# Patient Record
Sex: FEMALE | Race: OTHER | URBAN - METROPOLITAN AREA
[De-identification: names, ages, dates, MRNs, and addresses within clinical notes are randomized per-mention and may not be internally consistent; named-entity substitution may affect disease eponyms.]

---

## 2022-07-11 ENCOUNTER — NEW PATIENT (OUTPATIENT)
Dept: URBAN - METROPOLITAN AREA CLINIC 49 | Facility: CLINIC | Age: 67
End: 2022-07-11

## 2022-07-11 DIAGNOSIS — H02.834: ICD-10-CM

## 2022-07-11 DIAGNOSIS — H25.13: ICD-10-CM

## 2022-07-11 DIAGNOSIS — H02.831: ICD-10-CM

## 2022-07-11 PROCEDURE — 92004 COMPRE OPH EXAM NEW PT 1/>: CPT

## 2022-07-11 PROCEDURE — 92015 DETERMINE REFRACTIVE STATE: CPT

## 2022-07-11 ASSESSMENT — VISUAL ACUITY
OD_CC: 20/20-2
OD_GLARE: 20/30
OD_GLARE: 20/50
OU_SC: J3@18"
OS_GLARE: 20/70
OS_GLARE: 20/200
OS_CC: 20/25-2

## 2022-07-11 ASSESSMENT — TONOMETRY
OD_IOP_MMHG: 11
OS_IOP_MMHG: 11

## 2023-01-05 ENCOUNTER — APPOINTMENT (RX ONLY)
Dept: URBAN - METROPOLITAN AREA CLINIC 56 | Facility: CLINIC | Age: 68
Setting detail: DERMATOLOGY
End: 2023-01-05

## 2023-01-05 DIAGNOSIS — L57.0 ACTINIC KERATOSIS: ICD-10-CM

## 2023-01-05 DIAGNOSIS — L82.1 OTHER SEBORRHEIC KERATOSIS: ICD-10-CM

## 2023-01-05 PROCEDURE — 17003 DESTRUCT PREMALG LES 2-14: CPT

## 2023-01-05 PROCEDURE — ? LIQUID NITROGEN

## 2023-01-05 PROCEDURE — ? COUNSELING

## 2023-01-05 PROCEDURE — 17000 DESTRUCT PREMALG LESION: CPT

## 2023-01-05 PROCEDURE — 99202 OFFICE O/P NEW SF 15 MIN: CPT | Mod: 25

## 2023-01-05 ASSESSMENT — LOCATION DETAILED DESCRIPTION DERM
LOCATION DETAILED: LEFT INFERIOR LATERAL MALAR CHEEK
LOCATION DETAILED: NASAL ROOT
LOCATION DETAILED: NASAL DORSUM

## 2023-01-05 ASSESSMENT — LOCATION SIMPLE DESCRIPTION DERM
LOCATION SIMPLE: LEFT CHEEK
LOCATION SIMPLE: NOSE

## 2023-01-05 ASSESSMENT — LOCATION ZONE DERM
LOCATION ZONE: NOSE
LOCATION ZONE: FACE

## 2023-01-05 NOTE — PROCEDURE: LIQUID NITROGEN
Render Note In Bullet Format When Appropriate: No
Post-Care Instructions: I reviewed with the patient in detail post-care instructions. Patient is to wear sunprotection, and avoid picking at any of the treated lesions. Pt may apply Vaseline to crusted or scabbing areas.
Number Of Freeze-Thaw Cycles: 1 freeze-thaw cycle
Duration Of Freeze Thaw-Cycle (Seconds): 5
Show Applicator Variable?: Yes
Consent: The patient's consent was obtained including but not limited to risks of crusting, scabbing, blistering, scarring, darker or lighter pigmentary change, recurrence, incomplete removal and infection.
Detail Level: Detailed

## 2023-08-02 ENCOUNTER — COMPREHENSIVE EXAM (OUTPATIENT)
Dept: URBAN - METROPOLITAN AREA CLINIC 49 | Facility: LOCATION | Age: 68
End: 2023-08-02

## 2023-08-02 DIAGNOSIS — H52.4: ICD-10-CM

## 2023-08-02 DIAGNOSIS — H02.831: ICD-10-CM

## 2023-08-02 DIAGNOSIS — H25.13: ICD-10-CM

## 2023-08-02 DIAGNOSIS — H02.834: ICD-10-CM

## 2023-08-02 PROCEDURE — 92015 DETERMINE REFRACTIVE STATE: CPT

## 2023-08-02 PROCEDURE — 92014 COMPRE OPH EXAM EST PT 1/>: CPT

## 2023-08-02 ASSESSMENT — VISUAL ACUITY
OD_GLARE: 20/100
OD_GLARE: 20/40-1
OS_CC: 20/20
OS_GLARE: 20/70
OD_CC: 20/20
OU_CC: J2 @ 17IN
OS_GLARE: >20/400

## 2023-08-02 ASSESSMENT — TONOMETRY
OD_IOP_MMHG: 10
OS_IOP_MMHG: 10

## 2023-08-22 ENCOUNTER — APPOINTMENT (RX ONLY)
Dept: URBAN - METROPOLITAN AREA CLINIC 56 | Facility: CLINIC | Age: 68
Setting detail: DERMATOLOGY
End: 2023-08-22

## 2023-08-22 DIAGNOSIS — L82.1 OTHER SEBORRHEIC KERATOSIS: ICD-10-CM | Status: STABLE

## 2023-08-22 DIAGNOSIS — L81.4 OTHER MELANIN HYPERPIGMENTATION: ICD-10-CM | Status: STABLE

## 2023-08-22 DIAGNOSIS — D18.0 HEMANGIOMA: ICD-10-CM | Status: STABLE

## 2023-08-22 DIAGNOSIS — L57.0 ACTINIC KERATOSIS: ICD-10-CM | Status: INADEQUATELY CONTROLLED

## 2023-08-22 DIAGNOSIS — L81.5 LEUKODERMA, NOT ELSEWHERE CLASSIFIED: ICD-10-CM | Status: STABLE

## 2023-08-22 DIAGNOSIS — I83.9 ASYMPTOMATIC VARICOSE VEINS OF LOWER EXTREMITIES: ICD-10-CM | Status: STABLE

## 2023-08-22 DIAGNOSIS — Z71.89 OTHER SPECIFIED COUNSELING: ICD-10-CM

## 2023-08-22 DIAGNOSIS — D22 MELANOCYTIC NEVI: ICD-10-CM | Status: STABLE

## 2023-08-22 PROBLEM — D18.01 HEMANGIOMA OF SKIN AND SUBCUTANEOUS TISSUE: Status: ACTIVE | Noted: 2023-08-22

## 2023-08-22 PROBLEM — D22.5 MELANOCYTIC NEVI OF TRUNK: Status: ACTIVE | Noted: 2023-08-22

## 2023-08-22 PROBLEM — I83.93 ASYMPTOMATIC VARICOSE VEINS OF BILATERAL LOWER EXTREMITIES: Status: ACTIVE | Noted: 2023-08-22

## 2023-08-22 PROCEDURE — ? COUNSELING

## 2023-08-22 PROCEDURE — ? ADDITIONAL NOTES

## 2023-08-22 PROCEDURE — 17000 DESTRUCT PREMALG LESION: CPT

## 2023-08-22 PROCEDURE — ? LIQUID NITROGEN

## 2023-08-22 PROCEDURE — 99213 OFFICE O/P EST LOW 20 MIN: CPT | Mod: 25

## 2023-08-22 ASSESSMENT — LOCATION DETAILED DESCRIPTION DERM
LOCATION DETAILED: LEFT INFERIOR MEDIAL UPPER BACK
LOCATION DETAILED: LEFT PROXIMAL PRETIBIAL REGION
LOCATION DETAILED: RIGHT PROXIMAL PRETIBIAL REGION
LOCATION DETAILED: UPPER STERNUM
LOCATION DETAILED: LEFT SUPERIOR MEDIAL MIDBACK
LOCATION DETAILED: RIGHT SUPERIOR MEDIAL UPPER BACK
LOCATION DETAILED: LEFT INFERIOR MEDIAL MIDBACK
LOCATION DETAILED: RIGHT LATERAL EYEBROW
LOCATION DETAILED: LEFT MID-UPPER BACK

## 2023-08-22 ASSESSMENT — LOCATION SIMPLE DESCRIPTION DERM
LOCATION SIMPLE: RIGHT EYEBROW
LOCATION SIMPLE: RIGHT UPPER BACK
LOCATION SIMPLE: LEFT PRETIBIAL REGION
LOCATION SIMPLE: CHEST
LOCATION SIMPLE: RIGHT PRETIBIAL REGION
LOCATION SIMPLE: LEFT UPPER BACK
LOCATION SIMPLE: LEFT LOWER BACK

## 2023-08-22 ASSESSMENT — LOCATION ZONE DERM
LOCATION ZONE: TRUNK
LOCATION ZONE: LEG
LOCATION ZONE: FACE

## 2023-08-22 NOTE — PROCEDURE: LIQUID NITROGEN
Render Note In Bullet Format When Appropriate: No
Duration Of Freeze Thaw-Cycle (Seconds): 5
Number Of Freeze-Thaw Cycles: 1 freeze-thaw cycle
Show Aperture Variable?: Yes
Consent: The patient's consent was obtained including but not limited to risks of crusting, scabbing, blistering, scarring, darker or lighter pigmentary change, recurrence, incomplete removal and infection.
Post-Care Instructions: I reviewed with the patient in detail post-care instructions. Patient is to wear sunprotection, and avoid picking at any of the treated lesions. Pt may apply Vaseline to crusted or scabbing areas.
Detail Level: Detailed

## 2023-11-15 ENCOUNTER — PRE-OP/H&P (OUTPATIENT)
Dept: URBAN - METROPOLITAN AREA CLINIC 53 | Facility: CLINIC | Age: 68
End: 2023-11-15

## 2023-11-15 DIAGNOSIS — H25.813: ICD-10-CM

## 2023-11-15 PROCEDURE — PREOP PRE OP VISIT

## 2023-11-15 PROCEDURE — 92136 OPHTHALMIC BIOMETRY: CPT

## 2023-11-15 PROCEDURE — 92025CV CORNEAL TOPOGRAPHY, CV

## 2023-11-15 ASSESSMENT — KERATOMETRY
OD_AXISANGLE_DEGREES: 076
OS_AXISANGLE2_DEGREES: 80
OD_AXISANGLE2_DEGREES: 166
OS_K1POWER_DIOPTERS: 47.37
OD_K2POWER_DIOPTERS: 45.50
OD_K1POWER_DIOPTERS: 46.50
OS_AXISANGLE_DEGREES: 170
OS_K2POWER_DIOPTERS: 47

## 2023-11-15 ASSESSMENT — TONOMETRY
OS_IOP_MMHG: 13
OD_IOP_MMHG: 13

## 2023-11-15 ASSESSMENT — VISUAL ACUITY
OD_CC: 20/25+2
OS_CC: 20/25+2

## 2023-11-22 ENCOUNTER — SURGERY/PROCEDURE (OUTPATIENT)
Dept: URBAN - METROPOLITAN AREA SURGERY 16 | Facility: SURGERY | Age: 68
End: 2023-11-22

## 2023-11-22 ENCOUNTER — POST-OP (OUTPATIENT)
Dept: URBAN - METROPOLITAN AREA CLINIC 53 | Facility: CLINIC | Age: 68
End: 2023-11-22

## 2023-11-22 DIAGNOSIS — H25.811: ICD-10-CM

## 2023-11-22 DIAGNOSIS — Z98.41: ICD-10-CM

## 2023-11-22 DIAGNOSIS — Z96.1: ICD-10-CM

## 2023-11-22 PROCEDURE — 99199PCV CUSTOM VISION

## 2023-11-22 PROCEDURE — 66984CV REMOVE CATARACT, INSERT LENS, CUSTOM VISION

## 2023-11-22 ASSESSMENT — KERATOMETRY
OD_K1POWER_DIOPTERS: 46.50
OS_AXISANGLE2_DEGREES: 80
OS_K1POWER_DIOPTERS: 47.37
OD_AXISANGLE_DEGREES: 076
OS_K1POWER_DIOPTERS: 47.37
OD_K1POWER_DIOPTERS: 46.50
OD_AXISANGLE_DEGREES: 076
OS_K2POWER_DIOPTERS: 47
OS_AXISANGLE_DEGREES: 170
OD_AXISANGLE2_DEGREES: 166
OS_AXISANGLE2_DEGREES: 80
OD_K2POWER_DIOPTERS: 45.50
OS_K2POWER_DIOPTERS: 47
OS_AXISANGLE_DEGREES: 170
OD_AXISANGLE2_DEGREES: 166
OD_K2POWER_DIOPTERS: 45.50

## 2023-11-22 ASSESSMENT — VISUAL ACUITY: OD_SC: 20/20

## 2023-11-22 ASSESSMENT — TONOMETRY: OD_IOP_MMHG: 08

## 2023-11-28 ENCOUNTER — POST OP/EVAL OF SECOND EYE (OUTPATIENT)
Dept: URBAN - METROPOLITAN AREA CLINIC 49 | Facility: LOCATION | Age: 68
End: 2023-11-28

## 2023-11-28 DIAGNOSIS — Z96.1: ICD-10-CM

## 2023-11-28 DIAGNOSIS — H25.812: ICD-10-CM

## 2023-11-28 DIAGNOSIS — Z98.41: ICD-10-CM

## 2023-11-28 PROCEDURE — 92136NC IOL MASTER NO CHARGE

## 2023-11-28 PROCEDURE — 99213 OFFICE O/P EST LOW 20 MIN: CPT

## 2023-11-28 ASSESSMENT — KERATOMETRY
OD_AXISANGLE2_DEGREES: 166
OS_AXISANGLE2_DEGREES: 80
OS_K1POWER_DIOPTERS: 47.37
OS_K2POWER_DIOPTERS: 47
OD_K1POWER_DIOPTERS: 46.50
OD_AXISANGLE_DEGREES: 076
OS_AXISANGLE_DEGREES: 170
OD_K2POWER_DIOPTERS: 45.50

## 2023-11-28 ASSESSMENT — TONOMETRY
OS_IOP_MMHG: 10
OD_IOP_MMHG: 10

## 2023-11-28 ASSESSMENT — VISUAL ACUITY
OD_SC: 20/25
OD_SC: 20/30
OS_GLARE: 20/60
OS_SC: 20/40
OS_GLARE: 20/60
OD_SC: J3

## 2023-12-07 ENCOUNTER — POST-OP (OUTPATIENT)
Dept: URBAN - METROPOLITAN AREA CLINIC 49 | Facility: LOCATION | Age: 68
End: 2023-12-07

## 2023-12-07 ENCOUNTER — SURGERY/PROCEDURE (OUTPATIENT)
Dept: URBAN - METROPOLITAN AREA SURGERY 16 | Facility: SURGERY | Age: 68
End: 2023-12-07

## 2023-12-07 DIAGNOSIS — H25.812: ICD-10-CM

## 2023-12-07 DIAGNOSIS — Z96.1: ICD-10-CM

## 2023-12-07 DIAGNOSIS — Z98.42: ICD-10-CM

## 2023-12-07 PROCEDURE — 66984CV REMOVE CATARACT, INSERT LENS, CUSTOM VISION

## 2023-12-07 PROCEDURE — 99199PCV CUSTOM VISION

## 2023-12-07 ASSESSMENT — KERATOMETRY
OS_K2POWER_DIOPTERS: 47
OD_AXISANGLE_DEGREES: 076
OS_AXISANGLE2_DEGREES: 80
OS_AXISANGLE2_DEGREES: 80
OD_AXISANGLE2_DEGREES: 166
OD_K2POWER_DIOPTERS: 45.50
OS_K1POWER_DIOPTERS: 47.37
OD_K2POWER_DIOPTERS: 45.50
OS_K1POWER_DIOPTERS: 47.37
OS_AXISANGLE_DEGREES: 170
OD_K1POWER_DIOPTERS: 46.50
OD_AXISANGLE2_DEGREES: 166
OD_AXISANGLE_DEGREES: 076
OS_K2POWER_DIOPTERS: 47
OD_K1POWER_DIOPTERS: 46.50
OS_AXISANGLE_DEGREES: 170

## 2023-12-07 ASSESSMENT — TONOMETRY: OS_IOP_MMHG: 14

## 2023-12-07 ASSESSMENT — VISUAL ACUITY: OS_SC: 20/30

## 2023-12-12 ENCOUNTER — POST-OP (OUTPATIENT)
Dept: URBAN - METROPOLITAN AREA CLINIC 49 | Facility: LOCATION | Age: 68
End: 2023-12-12

## 2023-12-12 DIAGNOSIS — Z96.1: ICD-10-CM

## 2023-12-12 DIAGNOSIS — Z98.42: ICD-10-CM

## 2023-12-12 PROCEDURE — 99024 POSTOP FOLLOW-UP VISIT: CPT

## 2023-12-12 ASSESSMENT — KERATOMETRY
OS_AXISANGLE_DEGREES: 170
OD_K1POWER_DIOPTERS: 46.50
OS_K2POWER_DIOPTERS: 47
OS_AXISANGLE2_DEGREES: 80
OD_K2POWER_DIOPTERS: 45.50
OD_AXISANGLE2_DEGREES: 166
OS_K1POWER_DIOPTERS: 47.37
OD_AXISANGLE_DEGREES: 076

## 2023-12-12 ASSESSMENT — VISUAL ACUITY
OD_SC: 20/25
OS_SC: J3
OS_SC: 20/40+2
OS_SC: 20/20

## 2023-12-12 ASSESSMENT — TONOMETRY
OS_IOP_MMHG: 10
OD_IOP_MMHG: 10

## 2024-01-02 ENCOUNTER — POST-OP (OUTPATIENT)
Dept: URBAN - METROPOLITAN AREA CLINIC 49 | Facility: LOCATION | Age: 69
End: 2024-01-02

## 2024-01-02 DIAGNOSIS — Z96.1: ICD-10-CM

## 2024-01-02 DIAGNOSIS — Z98.42: ICD-10-CM

## 2024-01-02 PROCEDURE — 99024 POSTOP FOLLOW-UP VISIT: CPT

## 2024-01-02 PROCEDURE — 92015GRNC REFRACTION GLASSES RECHECK NO CHARGE

## 2024-01-02 ASSESSMENT — KERATOMETRY
OS_K1POWER_DIOPTERS: 47.37
OD_AXISANGLE_DEGREES: 076
OS_K2POWER_DIOPTERS: 47
OS_AXISANGLE_DEGREES: 170
OD_K2POWER_DIOPTERS: 45.50
OD_AXISANGLE2_DEGREES: 166
OS_AXISANGLE2_DEGREES: 80
OD_K1POWER_DIOPTERS: 46.50

## 2024-01-02 ASSESSMENT — VISUAL ACUITY
OD_SC: 20/25
OS_SC: 20/20
OU_SC: 20/20

## 2024-01-02 ASSESSMENT — TONOMETRY
OS_IOP_MMHG: 09
OD_IOP_MMHG: 08

## 2025-02-27 ENCOUNTER — EMERGENCY VISIT (OUTPATIENT)
Age: 70
End: 2025-02-27

## 2025-02-27 DIAGNOSIS — H35.371: ICD-10-CM

## 2025-02-27 DIAGNOSIS — H43.811: ICD-10-CM

## 2025-02-27 DIAGNOSIS — H02.831: ICD-10-CM

## 2025-02-27 DIAGNOSIS — H02.834: ICD-10-CM

## 2025-02-27 PROCEDURE — 99213 OFFICE O/P EST LOW 20 MIN: CPT

## 2025-03-26 ENCOUNTER — FOLLOW UP (OUTPATIENT)
Age: 70
End: 2025-03-26

## 2025-03-26 DIAGNOSIS — H02.834: ICD-10-CM

## 2025-03-26 DIAGNOSIS — H02.831: ICD-10-CM

## 2025-03-26 DIAGNOSIS — H43.811: ICD-10-CM

## 2025-03-26 DIAGNOSIS — H35.371: ICD-10-CM

## 2025-03-26 PROCEDURE — 99213 OFFICE O/P EST LOW 20 MIN: CPT
